# Patient Record
Sex: MALE | Race: BLACK OR AFRICAN AMERICAN | NOT HISPANIC OR LATINO | Employment: OTHER
[De-identification: names, ages, dates, MRNs, and addresses within clinical notes are randomized per-mention and may not be internally consistent; named-entity substitution may affect disease eponyms.]

---
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
## 2023-10-12 ENCOUNTER — HOSPITAL ENCOUNTER (EMERGENCY)
Facility: HOSPITAL | Age: 23
Discharge: HOME | End: 2023-10-12
Admitting: PHYSICIAN ASSISTANT
Payer: OTHER GOVERNMENT

## 2023-10-12 VITALS
SYSTOLIC BLOOD PRESSURE: 165 MMHG | HEART RATE: 102 BPM | RESPIRATION RATE: 14 BRPM | OXYGEN SATURATION: 98 % | TEMPERATURE: 98.6 F | DIASTOLIC BLOOD PRESSURE: 92 MMHG

## 2023-10-12 DIAGNOSIS — S05.02XA ABRASION OF LEFT CORNEA, INITIAL ENCOUNTER: ICD-10-CM

## 2023-10-12 PROCEDURE — 250N000009 HC RX 250: Performed by: PHYSICIAN ASSISTANT

## 2023-10-12 PROCEDURE — 99283 EMERGENCY DEPT VISIT LOW MDM: CPT

## 2023-10-12 RX ORDER — ERYTHROMYCIN 5 MG/G
0.5 OINTMENT OPHTHALMIC AT BEDTIME
Qty: 3.5 G | Refills: 0 | Status: SHIPPED | OUTPATIENT
Start: 2023-10-12

## 2023-10-12 RX ORDER — TETRACAINE HYDROCHLORIDE 5 MG/ML
1-2 SOLUTION OPHTHALMIC ONCE
Status: COMPLETED | OUTPATIENT
Start: 2023-10-12 | End: 2023-10-12

## 2023-10-12 RX ADMIN — FLUORESCEIN SODIUM 1 STRIP: 1 STRIP OPHTHALMIC at 14:36

## 2023-10-12 RX ADMIN — TETRACAINE HYDROCHLORIDE 2 DROP: 5 SOLUTION OPHTHALMIC at 14:35

## 2023-10-12 ASSESSMENT — TONOMETRY
IOP_HANDHELD: 1
OS_IOP_MMHG: 10

## 2023-10-12 ASSESSMENT — ACTIVITIES OF DAILY LIVING (ADL): ADLS_ACUITY_SCORE: 33

## 2023-10-12 NOTE — DISCHARGE INSTRUCTIONS
You were seen here in the emergency department for evaluation of left-sided eye injury.  You do have a corneal abrasion on the left side.  I have sent you antibiotics to the pharmacy, use these 4 times a day as needed for the next couple days.  Consider following up with an eye doctor if symptoms do not improve.  Your examination here otherwise is reassuring.    For pain or fever you may use:  -Tylenol 650 mg every 6 hours.  Max 4000 mg in 24 hours  Do not use thismedication with alcohol as it can cause liver problems.  -Ibuprofen 600 mg every 6 hours.  Max 3500 mg in 24 hours  Do not take this medication if you have a history of a GI bleed or have kidney problems.  You may use both of these medications at the same time or you can alternate them every 3 hours.  For example, Tylenol at 6 AM, ibuprofen at 9 AM, Tylenol at noon, etc.

## 2023-10-12 NOTE — ED TRIAGE NOTES
Pt was cooking yesterday and hot grease splashed into left eye. C/o increased pain, redness and light sensitivity today

## 2023-10-12 NOTE — ED PROVIDER NOTES
EMERGENCY DEPARTMENT ENCOUNTER      NAME: Slim Mejia  AGE: 23 year old male  YOB: 2000  MRN: [id number]  EVALUATION DATE & TIME: 10/12/2023  2:14 PM    PCP: Lena Grey    ED PROVIDER: Atul Nolan PA-C      Chief Complaint   Patient presents with    Eye Injury         FINAL IMPRESSION:  1. Abrasion of left cornea, initial encounter          ED COURSE & MEDICAL DECISION MAKING:    Pertinent Labs & Imaging studies reviewed. (See chart for details)  2:15 PM I met the patient and performed my initial interview and exam.   2:45 PM Exam performed, plan for discharge.     23 year old male presents to the Emergency Department for evaluation of eye pain.     ED Course as of 10/12/23 1447   Thu Oct 12, 2023   1425 Patient is a 23-year-old male who presents emergency department for evaluation of left-sided pain.  Patient reports increased pain, redness and light sensitivity.  He denies any fevers, other symptoms.  On examination here, no evidence of any foreign bodies, mild injection to the left eye, no evidence of obvious abrasion.  No light sensitivity here.  No pain with extraocular eye movements.  Pupils are equal and reactive, no pain with movement.  Vision 20/30 on the left eye, patient does not wear glasses or contacts.  Will perform fluorescein staining here, as well as pressures.   1445 Fluorescein staining performed here in the emergency department, patient has a diffuse abrasion to the left lateral side of his eye.  Negative serafin sign.  Otherwise no acute abnormalities noted.  Diffuse fluorescein uptake to the area.  pH is here normal.  Eye pressures here 10 mmHg.  Recommend he follows up with an eye doctor if pain does not improve, otherwise no evidence of any acute abnormalities.  Will be placed on erythromycin ointment, patient will be discharged at this time.     Medical Decision Making    History:  Supplemental history from: Documented in chart, if applicable  External  Record(s) reviewed: Documented in chart, if applicable.    Work Up:  Chart documentation includes differential considered and any EKGs or imaging independently interpreted by provider, where specified.  In additional to work up documented, I considered the following work up: Documented in chart, if applicable.    External consultation:  Discussion of management with another provider: Documented in chart, if applicable    Complicating factors:  Care impacted by chronic illness: N/A  Care affected by social determinants of health: N/A    Disposition considerations: Discharge. I prescribed additional prescription strength medication(s) as charted. N/A.             At the conclusion of the encounter I discussed the results of all of the tests and the disposition. The questions were answered. The patient or family acknowledged understanding and was agreeable with the care plan.       0 minutes of critical care time     MEDICATIONS GIVEN IN THE EMERGENCY:  Medications   fluorescein (FUL-CHARBEL) ophthalmic strip 1 strip (1 strip Left Eye $Given 10/12/23 1436)   tetracaine (PONTOCAINE) 0.5 % ophthalmic solution 1-2 drop (2 drops Left Eye $Given 10/12/23 143)       NEW PRESCRIPTIONS STARTED AT TODAY'S ER VISIT  New Prescriptions    ERYTHROMYCIN (ROMYCIN) 5 MG/GM OPHTHALMIC OINTMENT    Place 0.5 inches Into the left eye at bedtime       =================================================================    HPI    Patient information was obtained from: Patient     Use of : N/A         Slim Mejia is a 23 year old male with a pertinent history of speech delay, ADHD who presents to this ED for evaluation of left-sided eye injury.  Patient reportedly was cooking yesterday, had some grease and splashed into his eye, reporting having increased pain and redness to the left eye, as well as some light sensitivity.  Does not wear glasses or contacts.  Denies any blurry vision here.  No other acute symptoms.  Has not taken  anything for pain.      PAST MEDICAL HISTORY:  Past Medical History:   Diagnosis Date    Asthma        PAST SURGICAL HISTORY:  No past surgical history on file.      CURRENT MEDICATIONS:    erythromycin (ROMYCIN) 5 MG/GM ophthalmic ointment  methylphenidate (RITALIN) 5 MG tablet       ALLERGIES:  No Known Allergies    FAMILY HISTORY:  No family history on file.    SOCIAL HISTORY:   Social History     Socioeconomic History    Marital status: Single   Tobacco Use    Smoking status: Never   Social History Narrative    Lives with grandparents.  Parents .  Mom has drug problems.       VITALS:  BP (!) 165/92   Pulse 102   Temp 98.6  F (37  C)   Resp 14   SpO2 98%     PHYSICAL EXAM    Physical Exam  Vitals and nursing note reviewed.   Constitutional:       General: He is not in acute distress.     Appearance: Normal appearance. He is normal weight. He is not toxic-appearing or diaphoretic.   HENT:      Right Ear: External ear normal.      Left Ear: External ear normal.   Eyes:      General: Lids are normal.         Right eye: No discharge.         Left eye: No discharge.      Intraocular pressure: Left eye pressure is 10 mmHg. Measurements were taken using a handheld tonometer.     Extraocular Movements:      Right eye: Normal extraocular motion and no nystagmus.      Left eye: Normal extraocular motion and no nystagmus.      Conjunctiva/sclera: Conjunctivae normal.      Pupils: Pupils are equal, round, and reactive to light.      Left eye: Corneal abrasion and fluorescein uptake present. Alexandra exam negative.     Comments: Left eye 20/30. Pressures 10mmHg   Cardiovascular:      Rate and Rhythm: Normal rate and regular rhythm.      Pulses: Normal pulses.      Heart sounds: No murmur heard.     No friction rub. No gallop.   Pulmonary:      Effort: Pulmonary effort is normal. No respiratory distress.      Breath sounds: No stridor. No wheezing or rales.   Neurological:      Mental Status: He is alert. Mental  status is at baseline.         LAB:  All pertinent labs reviewed and interpreted.  Labs Ordered and Resulted from Time of ED Arrival to Time of ED Departure - No data to display    RADIOLOGY:  Reviewed all pertinent imaging. Please see official radiology report.  No orders to display     PROCEDURES:     PROCEDURE: Woods lamp Exam   INDICATIONS: Left eye discomfort   PROCEDURE PROVIDER: Lencho Nolan PA-C   SITE: left eye   CONSENT:  The risks, benefits and alternatives for this procedure were explained to the patient and verbally accepted.     MEDICATION: fluorescein stain and tetracaine   EXAM FINDINGS: Right Eye: NA  Left Eye: abrasion to the left lateral portion of the eye, negative serafin sign    COMPLICATIONS: Patient tolerated procedure well, without complication       Atul Nolan PA-C  Emergency Medicine  [facility] EMERGENCY DEPARTMENT  [facility] [facility] [id number]  [phone]  Dept: [phone]       Atul Nolan PA-C  10/12/23 1447